# Patient Record
Sex: FEMALE | Race: OTHER | HISPANIC OR LATINO | URBAN - METROPOLITAN AREA
[De-identification: names, ages, dates, MRNs, and addresses within clinical notes are randomized per-mention and may not be internally consistent; named-entity substitution may affect disease eponyms.]

---

## 2020-08-03 ENCOUNTER — EMERGENCY (EMERGENCY)
Facility: HOSPITAL | Age: 60
LOS: 1 days | Discharge: HOME | End: 2020-08-03
Attending: EMERGENCY MEDICINE
Payer: MEDICAID

## 2020-08-03 VITALS
HEART RATE: 80 BPM | RESPIRATION RATE: 16 BRPM | SYSTOLIC BLOOD PRESSURE: 140 MMHG | TEMPERATURE: 98 F | OXYGEN SATURATION: 100 % | DIASTOLIC BLOOD PRESSURE: 70 MMHG

## 2020-08-03 VITALS
HEART RATE: 91 BPM | OXYGEN SATURATION: 99 % | DIASTOLIC BLOOD PRESSURE: 75 MMHG | RESPIRATION RATE: 18 BRPM | HEIGHT: 60 IN | SYSTOLIC BLOOD PRESSURE: 159 MMHG | TEMPERATURE: 98 F | WEIGHT: 101.41 LBS

## 2020-08-03 PROCEDURE — 71045 X-RAY EXAM CHEST 1 VIEW: CPT | Mod: 26

## 2020-08-03 PROCEDURE — 73030 X-RAY EXAM OF SHOULDER: CPT | Mod: 26,RT

## 2020-08-03 PROCEDURE — 70450 CT HEAD/BRAIN W/O DYE: CPT | Mod: 26

## 2020-08-03 PROCEDURE — 99284 EMERGENCY DEPT VISIT MOD MDM: CPT

## 2020-08-03 PROCEDURE — 73630 X-RAY EXAM OF FOOT: CPT

## 2020-08-03 PROCEDURE — 70450 CT HEAD/BRAIN W/O DYE: CPT

## 2020-08-03 PROCEDURE — 71045 X-RAY EXAM CHEST 1 VIEW: CPT

## 2020-08-03 PROCEDURE — 72125 CT NECK SPINE W/O DYE: CPT

## 2020-08-03 PROCEDURE — 73610 X-RAY EXAM OF ANKLE: CPT | Mod: 26,RT

## 2020-08-03 PROCEDURE — 73080 X-RAY EXAM OF ELBOW: CPT

## 2020-08-03 PROCEDURE — 73030 X-RAY EXAM OF SHOULDER: CPT

## 2020-08-03 PROCEDURE — 73630 X-RAY EXAM OF FOOT: CPT | Mod: 26,RT

## 2020-08-03 PROCEDURE — 72125 CT NECK SPINE W/O DYE: CPT | Mod: 26

## 2020-08-03 PROCEDURE — 73610 X-RAY EXAM OF ANKLE: CPT

## 2020-08-03 PROCEDURE — 99285 EMERGENCY DEPT VISIT HI MDM: CPT

## 2020-08-03 PROCEDURE — 72170 X-RAY EXAM OF PELVIS: CPT | Mod: 26

## 2020-08-03 PROCEDURE — 73080 X-RAY EXAM OF ELBOW: CPT | Mod: 26,RT

## 2020-08-03 PROCEDURE — 72170 X-RAY EXAM OF PELVIS: CPT

## 2020-08-03 RX ORDER — ACETAMINOPHEN 500 MG
650 TABLET ORAL ONCE
Refills: 0 | Status: COMPLETED | OUTPATIENT
Start: 2020-08-03 | End: 2020-08-03

## 2020-08-03 RX ADMIN — Medication 650 MILLIGRAM(S): at 20:15

## 2020-08-03 NOTE — ED PROVIDER NOTE - NSFOLLOWUPINSTRUCTIONS_ED_ALL_ED_FT
Veniste al departamento de Emergencia para dolor despues de cayando. Hicimos radiografias y CT scan y no encontramos sangramento ni fractura. Para dolor donna tylenol and usa hielo. Vuelve si el dolor se hace peor, si tienes fiebre, dolor de pecho, o se falta aire. Si algo cambia y necesitas ayuda, vuelva y llama 911.

## 2020-08-03 NOTE — ED PROVIDER NOTE - PHYSICAL EXAMINATION
Vitals: I have reviewed the patients vital signs, WNL  General: well appearing, well nourished, no acute distress, ambulatory  HEENT: TTP on parietal and occipital scalp with no deformity or clear hematoma, no facial tenderness, airway patent, EOMI,   Neck: no JVD, no tracheal deviation, ranging in all directions without pain, no midline tenderness  Chest/Lungs: symmetric chest rise, lung sounds clear bilaterally, speaking in complete sentences, no TTP on ribs  Heart: Regular rate, regular rhythm, skin well perfused, no active non capillary bleeding, 2+ pulses  Abdomen: Soft and nontender throughout, no rebound or guarding  Neuro: A+Ox3, CNII-XII intact, ambulating without difficulty, non dysarthric speech, strength and sensation at baseline in all extremities with exception of R elbow due to pain  Eyes: PERRL, EOMI, no conjunctival injection  MSK: able to range all extremities with slight limitations to R shoulder and elbow, as well as R ankle. pelvis stable   Skin: no venous or arterial bleeding, no cyanosis, no jaundice, no new emergent lesions

## 2020-08-03 NOTE — ED ADULT TRIAGE NOTE - CHIEF COMPLAINT QUOTE
fell backwards down an escalator while pushing a shopping cart up, cart fell on top of her  no loc, ambulatory after incident, no blood thinners  c/o r elbow, r hip and back pain

## 2020-08-03 NOTE — ED PROVIDER NOTE - CLINICAL SUMMARY MEDICAL DECISION MAKING FREE TEXT BOX
60 y/o F hx DM and HTN not on AC/AP presents s/p mechanical fall where she fell backwards and hit her head and had a shopping cart flip onto her while riding an escalator. Low suspicion for underlying medical cause for injury. No loc, neuro exam normal. Pain throughout RUE and RLE as well as pelvis. Will CT head and Neck, XR other areas of pain. Results non concerning. Pt able to ambulate. Refusing tetanus vaccine with knowledge of risks and benefits. Given return precautions

## 2020-08-03 NOTE — ED PROVIDER NOTE - OBJECTIVE STATEMENT
60 y/o F hx DM and HTN not on AC/AP presents s/p mechanical fall where she fell backwards and hit her head and had a shopping cart flip onto her while riding an escalator. She denies LOC. Endorses pain to head, right elbow, ankle, foot, and bilateral hips. States prior to fall no lightheadedness, CP, sob and in usual state of health. Ambulatory since incident. Denies cp, sob, n/v, weakness, abd pain. Did not take medications PTA

## 2020-08-03 NOTE — ED ADULT NURSE NOTE - OBJECTIVE STATEMENT
58 YO female with PMH HTN, DM,  via walk in presenting with chief complaint of fall. Pt states that she was pushing shopping cart up escalator, when she feel backwards down an escalator and hit her head. Pt able to ambulate after fall. Pt recalls all events surrounding fall. Pt denies losing consciousness. Pt denies blood thinner usage. Pt endorsing head pain, as well as right rib pain. Pt endorsing bilateral hip pain, as well as feet are "sleeping." Pt denies chest pain, shortness of breath, visual disturbances, numbness/tingling, fever, chills, diaphoresis, headache, nausea, vomiting, constipation, diarrhea, or urinary symptoms.   Pt Axox4, gross neuro intact, PERRL 3 mm. Lungs clear throughout bilateral. S1S2 heard. Abdomen soft, non-tender, non-distended. Skin warm, dry, and intact. Pt placed in position of comfort. Pt educated on call bell system and provided call bell. Bed in lowest position, wheels locked, appropriate side rails raised. Pt denies needs at this time.

## 2020-08-03 NOTE — ED ADULT NURSE NOTE - NSIMPLEMENTINTERV_GEN_ALL_ED
Implemented All Fall Risk Interventions:  [place] to call system. Call bell, personal items and telephone within reach. Instruct patient to call for assistance. Room bathroom lighting operational. Non-slip footwear when patient is off stretcher. Physically safe environment: no spills, clutter or unnecessary equipment. Stretcher in lowest position, wheels locked, appropriate side rails in place. Provide visual cue, wrist band, yellow gown, etc. Monitor gait and stability. Monitor for mental status changes and reorient to person, place, and time. Review medications for side effects contributing to fall risk. Reinforce activity limits and safety measures with patient and family.

## 2020-08-03 NOTE — ED PROVIDER NOTE - NS ED ROS FT
Constitutional: (-) fever (-) vomiting  Eyes/ENT: (-) vision changes, (-) hearing changes  Cardiovascular: (-) chest pain, (-) wheezing  Respiratory: (-) cough, (-) shortness of breath  Gastrointestinal: (-) vomiting, (-) diarrhea, (-) abdominal pain  : (-) dysuria   Musculoskeletal: (-) back pain  Integumentary: (-) edema  Neurological: (-)loc  Allergic/Immunologic: (-) pruritus  Endocrine: No history of thyroid disease

## 2020-08-03 NOTE — ED PROVIDER NOTE - PROGRESS NOTE DETAILS
Rommel: results of xrays and CT not concerning. Spoke to patient about vaccinating against tetanus and she refused, knowing the risks of infection, up to and including death.

## 2020-08-03 NOTE — ED PROVIDER NOTE - PATIENT PORTAL LINK FT
You can access the FollowMyHealth Patient Portal offered by [facility] by registering at the following website: [email or web address]. By joining [employer]’s FollowMyHealth portal, you will also be able to view your health information using other applications (apps) compatible with our system.

## 2020-08-03 NOTE — ED PROVIDER NOTE - ATTENDING CONTRIBUTION TO CARE
59F, pmh htn, DM, not on a/c, presents s/p fall. pt was ascending escalator, was holding onto shopping cart, lost balance falling backwards. did strike head, denies LOC. now complaining of pain to R elbow, ankle, foot, hips, head. Denies any sx preceding fall including but not limited to cp, sob, palpitations, abd pain, n/v/d. Denies neck, back pain.    PE: NAD, +Hematoma, ttp parietal/occipital scalp, MMM, Trachea midline, Normal conjunctiva, lungs CTAB, S1/S2 RRR, Normal perfusion, 2+ radial pulses bilat, Abdomen Soft, NTND, No rebound/guarding, No LE edema, No deformity of extremities, No rashes,  No focal motor or sensory deficits. FROM bilat UE and LE with +diffuse ttp R shoulder, R elbow, R ankle. FROM hips without significant pain. No midilne C, T L ttp. No chest wall ttp.    Multiple injuries as noted s/p mechanical fall. No LOC. Neuro intact. Check XRs. CT head, c-spine. Give tylenol. Re-eval. - Corey Zuluaga MD

## 2020-08-03 NOTE — ED PROVIDER NOTE - CARE PLAN
Principal Discharge DX:	Fall Principal Discharge DX:	Contusion of head, unspecified part of head, initial encounter

## 2020-08-03 NOTE — ED ADULT NURSE NOTE - CHPI ED NUR SYMPTOMS NEG
no confusion/no loss of consciousness/no deformity/no tingling/no weakness/no fever/no numbness/no vomiting

## 2023-10-19 ENCOUNTER — RX ONLY (RX ONLY)
Age: 63
End: 2023-10-19

## 2023-10-19 ENCOUNTER — OFFICE (OUTPATIENT)
Dept: URBAN - METROPOLITAN AREA CLINIC 77 | Facility: CLINIC | Age: 63
Setting detail: OPHTHALMOLOGY
End: 2023-10-19
Payer: COMMERCIAL

## 2023-10-19 DIAGNOSIS — E11.3313: ICD-10-CM

## 2023-10-19 DIAGNOSIS — H25.12: ICD-10-CM

## 2023-10-19 DIAGNOSIS — E11.3311: ICD-10-CM

## 2023-10-19 DIAGNOSIS — E11.3312: ICD-10-CM

## 2023-10-19 DIAGNOSIS — H25.11: ICD-10-CM

## 2023-10-19 PROCEDURE — 67028 INJECTION EYE DRUG: CPT | Mod: 50 | Performed by: OPHTHALMOLOGY

## 2023-10-19 PROCEDURE — 92235 FLUORESCEIN ANGRPH MLTIFRAME: CPT | Performed by: OPHTHALMOLOGY

## 2023-10-19 PROCEDURE — 99204 OFFICE O/P NEW MOD 45 MIN: CPT | Mod: 25 | Performed by: OPHTHALMOLOGY

## 2023-10-19 PROCEDURE — 92250 FUNDUS PHOTOGRAPHY W/I&R: CPT | Performed by: OPHTHALMOLOGY

## 2023-10-19 ASSESSMENT — SPHEQUIV_DERIVED
OS_SPHEQUIV: 1.375
OD_SPHEQUIV: 0.625
OS_SPHEQUIV: 1.25
OD_SPHEQUIV: 0.75

## 2023-10-19 ASSESSMENT — REFRACTION_MANIFEST
OD_ADD: +2.50
OD_AXIS: 85
OS_ADD: +2.50
OD_CYLINDER: -0.50
OS_SPHERE: +1.50
OS_CYLINDER: -0.50
OS_AXIS: 80
OS_VA1: 20/50
OD_VA1: 20/60
OD_SPHERE: +1.00

## 2023-10-19 ASSESSMENT — REFRACTION_CURRENTRX
OD_CYLINDER: -0.50
OD_SPHERE: +3.00
OS_SPHERE: +3.75
OS_AXIS: 90
OS_OVR_VA: 20/
OS_CYLINDER: -1.00
OD_AXIS: 55
OD_OVR_VA: 20/

## 2023-10-19 ASSESSMENT — TONOMETRY
OD_IOP_MMHG: 15
OS_IOP_MMHG: 15

## 2023-10-19 ASSESSMENT — KERATOMETRY
OD_K2POWER_DIOPTERS: 47.25
OS_K1POWER_DIOPTERS: 46.25
OD_K1POWER_DIOPTERS: 47.00
OS_K2POWER_DIOPTERS: 46.75
OS_AXISANGLE_DEGREES: 166
OD_AXISANGLE_DEGREES: 144

## 2023-10-19 ASSESSMENT — REFRACTION_AUTOREFRACTION
OS_SPHERE: +1.75
OD_AXIS: 084
OD_SPHERE: +1.00
OD_CYLINDER: -0.75
OS_AXIS: 082
OS_CYLINDER: -0.75

## 2023-10-19 ASSESSMENT — VISUAL ACUITY
OS_BCVA: 20/70
OD_BCVA: 20/60

## 2023-10-19 ASSESSMENT — CONFRONTATIONAL VISUAL FIELD TEST (CVF)
OD_FINDINGS: FULL
OS_FINDINGS: FULL

## 2023-10-19 ASSESSMENT — AXIALLENGTH_DERIVED
OS_AL: 22.0606
OD_AL: 22.0728
OD_AL: 22.1155
OS_AL: 22.1032

## 2023-11-21 ENCOUNTER — OFFICE (OUTPATIENT)
Dept: URBAN - METROPOLITAN AREA CLINIC 77 | Facility: CLINIC | Age: 63
Setting detail: OPHTHALMOLOGY
End: 2023-11-21
Payer: COMMERCIAL

## 2023-11-21 DIAGNOSIS — E11.3313: ICD-10-CM

## 2023-11-21 DIAGNOSIS — H25.11: ICD-10-CM

## 2023-11-21 PROCEDURE — 99213 OFFICE O/P EST LOW 20 MIN: CPT | Mod: 25 | Performed by: OPHTHALMOLOGY

## 2023-11-21 PROCEDURE — 92250 FUNDUS PHOTOGRAPHY W/I&R: CPT | Performed by: OPHTHALMOLOGY

## 2023-11-21 PROCEDURE — 67028 INJECTION EYE DRUG: CPT | Mod: 50 | Performed by: OPHTHALMOLOGY

## 2023-11-21 ASSESSMENT — REFRACTION_CURRENTRX
OS_CYLINDER: -1.00
OD_AXIS: 55
OS_SPHERE: +3.75
OD_OVR_VA: 20/
OS_AXIS: 90
OD_CYLINDER: -0.50
OS_OVR_VA: 20/
OD_SPHERE: +3.00

## 2023-11-21 ASSESSMENT — SPHEQUIV_DERIVED
OS_SPHEQUIV: 1.25
OS_SPHEQUIV: 1.375
OD_SPHEQUIV: 0.75
OD_SPHEQUIV: 0.625

## 2023-11-21 ASSESSMENT — REFRACTION_AUTOREFRACTION
OD_AXIS: 084
OS_CYLINDER: -0.75
OS_AXIS: 082
OS_SPHERE: +1.75
OD_CYLINDER: -0.75
OD_SPHERE: +1.00

## 2023-11-21 ASSESSMENT — REFRACTION_MANIFEST
OS_AXIS: 80
OS_SPHERE: +1.50
OS_ADD: +2.50
OD_AXIS: 85
OD_ADD: +2.50
OS_CYLINDER: -0.50
OD_SPHERE: +1.00
OD_VA1: 20/60
OD_CYLINDER: -0.50
OS_VA1: 20/50

## 2023-11-21 ASSESSMENT — CONFRONTATIONAL VISUAL FIELD TEST (CVF)
OS_FINDINGS: FULL
OD_FINDINGS: FULL

## 2024-01-03 ENCOUNTER — OFFICE (OUTPATIENT)
Dept: URBAN - METROPOLITAN AREA CLINIC 77 | Facility: CLINIC | Age: 64
Setting detail: OPHTHALMOLOGY
End: 2024-01-03
Payer: MEDICARE

## 2024-01-03 DIAGNOSIS — E11.3313: ICD-10-CM

## 2024-01-03 DIAGNOSIS — H25.11: ICD-10-CM

## 2024-01-03 PROCEDURE — 67028 INJECTION EYE DRUG: CPT | Mod: 50 | Performed by: OPHTHALMOLOGY

## 2024-01-03 PROCEDURE — 99213 OFFICE O/P EST LOW 20 MIN: CPT | Mod: 25 | Performed by: OPHTHALMOLOGY

## 2024-01-03 PROCEDURE — 92250 FUNDUS PHOTOGRAPHY W/I&R: CPT | Performed by: OPHTHALMOLOGY

## 2024-01-03 ASSESSMENT — REFRACTION_MANIFEST
OS_CYLINDER: -0.50
OD_AXIS: 85
OS_ADD: +2.50
OD_ADD: +2.50
OS_AXIS: 80
OS_VA1: 20/50
OD_SPHERE: +1.00
OD_VA1: 20/60
OS_SPHERE: +1.50
OD_CYLINDER: -0.50

## 2024-01-03 ASSESSMENT — REFRACTION_CURRENTRX
OD_OVR_VA: 20/
OS_AXIS: 90
OD_CYLINDER: -0.50
OD_AXIS: 55
OD_SPHERE: +3.00
OS_OVR_VA: 20/
OS_CYLINDER: -1.00
OS_SPHERE: +3.75

## 2024-01-03 ASSESSMENT — CONFRONTATIONAL VISUAL FIELD TEST (CVF)
OD_FINDINGS: FULL
OS_FINDINGS: FULL

## 2024-01-03 ASSESSMENT — REFRACTION_AUTOREFRACTION
OS_CYLINDER: -0.75
OD_AXIS: 084
OS_SPHERE: +1.75
OS_AXIS: 082
OD_CYLINDER: -0.75
OD_SPHERE: +1.00

## 2024-01-03 ASSESSMENT — SPHEQUIV_DERIVED
OS_SPHEQUIV: 1.375
OD_SPHEQUIV: 0.75
OD_SPHEQUIV: 0.625
OS_SPHEQUIV: 1.25